# Patient Record
Sex: FEMALE | Race: WHITE
[De-identification: names, ages, dates, MRNs, and addresses within clinical notes are randomized per-mention and may not be internally consistent; named-entity substitution may affect disease eponyms.]

---

## 2020-12-18 ENCOUNTER — HOSPITAL ENCOUNTER (OUTPATIENT)
Dept: HOSPITAL 38 - CC.SDS | Age: 70
End: 2020-12-18
Attending: FAMILY MEDICINE
Payer: MEDICARE

## 2020-12-18 DIAGNOSIS — I10: ICD-10-CM

## 2020-12-18 DIAGNOSIS — Z12.11: Primary | ICD-10-CM

## 2020-12-18 DIAGNOSIS — K57.30: ICD-10-CM

## 2020-12-18 DIAGNOSIS — Z79.899: ICD-10-CM

## 2020-12-18 DIAGNOSIS — K29.90: ICD-10-CM

## 2020-12-18 DIAGNOSIS — K44.9: ICD-10-CM

## 2020-12-18 DIAGNOSIS — Z20.828: ICD-10-CM

## 2020-12-18 DIAGNOSIS — Z01.812: ICD-10-CM

## 2020-12-18 DIAGNOSIS — D12.5: ICD-10-CM

## 2020-12-18 PROCEDURE — 45380 COLONOSCOPY AND BIOPSY: CPT

## 2020-12-18 PROCEDURE — 43239 EGD BIOPSY SINGLE/MULTIPLE: CPT

## 2020-12-18 PROCEDURE — 87081 CULTURE SCREEN ONLY: CPT

## 2020-12-18 NOTE — OR
DATE OF OPERATION:  12/18/2020

 

PREOPERATIVE DIAGNOSIS:

1. DYSPHAGIA.

2. SCREENING COLONOSCOPY.

 

POSTOPERATIVE DIAGNOSIS:

1. DYSPHAGIA.

2. SCREENING COLONOSCOPY.

 

SURGEON:  Raymond Clinton MD

 

PROCEDURE:

1. DIAGNOSTIC ESOPHAGOGASTRODUODENOSCOPY WITH BIOPSIES X2, KEYON.

2. FULL-LENGTH COLONOSCOPY WITH FORCEPS POLYPECTOMY X3.

 

ANESTHESIA:  MAC.

 

COMPLICATIONS:  None.

 

SPECIMEN:

1. Duodenal bulb biopsy x1.

2. Antral biopsy x1.

3. Antral KEYON.

4. Three small hyperplastic polyps, rectosigmoid junction, proximal rectal

    vault.

 

FINDINGS:

1. Full-length diagnostic EGD.

2. Duodenitis.

3. Mild antral gastritis.

4. Small hiatal hernia.

5. Full-length screening colonoscopy.

6. Mild sigmoid diverticulosis.

7. Three small hyperplastic polyps, rectosigmoid junction.

 

RECOMMENDATIONS:

1. Appropriate treatment for the patient's duodenitis, gastritis, and

    treatment of KEYON, pending report.

2. Followup colonoscopy in 5 years.

 

INDICATIONS:  The patient was in for routine physical.  She is due for a

screening colonoscopy.  She also is having some occasional issues with

dysphagia, where food feels like it is getting hard to go down at the base of

her chest.  Sofya Benz sent her for both upper and lower endoscopy.

 

DESCRIPTION OF PROCEDURE:  The patient was prepped and draped, placed in the

left lateral decubitus position.  A lubricated Olympus gastroscope was inserted

over a bit, advanced to cricopharyngeus area and easily intubated into the

esophagus.  Esophageal lining was benign in its entire course.  The Z-line was

crisp and sharp around 39 to 40 cm.  There was a very small hiatal hernia

present without any obvious spontaneous reflux.  No distal esophagitis,

stricturing, ulceration, or Chase changes.  The scope was advanced into the

stomach through the pylorus and into the 2nd portion of the duodenum, this was

benign.  The duodenal bulb had evidence of mild duodenitis.  A biopsy was taken.

There were no ulcerations seen.  The scope was brought back into the stomach and

retroflexed.  The upper fundus and cardia were benign other than the small

hernia.  Upon straightening, the rest of the fundus appeared benign.  The

patient did have some mild, but active gastritis of the antrum.  Biopsy was

taken along with a CLOtest.  Air was then suctioned from the stomach and the

scope removed without complication.

 

A lubricated Olympus colonoscope was then inserted and easily advanced to the

cecum.  Direct visualization of the ileocecal valve and appendiceal orifice were

accomplished.  The bowel prep was excellent.  Upon withdrawal of the scope, the

cecum, ascending, transverse, and descending colons were completely benign.  The

patient did have few scattered diverticula in the sigmoid area, but very

minimal.  No inflammatory changes were seen in the rectosigmoid junction.  The

patient had 1 small hyperplastic polyp removed easily with a forceps.  In the

proximal rectal vault, the patient had a couple more very small 2 mm

hyperplastic polyps, also removed with a forceps.  The rest of the rectum

appeared benign.  Retroflexion showed some perianal hemorrhoid disease,

otherwise benign.  Air was suctioned.  Scope removed without complication.

 

ANNEMARIE/RYLEE

DD:  12/18/2020 10:35:40

DT:  12/18/2020 11:16:22

Job #:  [id number]